# Patient Record
Sex: FEMALE | Race: ASIAN | Employment: OTHER | URBAN - METROPOLITAN AREA
[De-identification: names, ages, dates, MRNs, and addresses within clinical notes are randomized per-mention and may not be internally consistent; named-entity substitution may affect disease eponyms.]

---

## 2017-08-02 ENCOUNTER — HOSPITAL ENCOUNTER (OUTPATIENT)
Age: 42
Discharge: HOME | End: 2017-08-02
Attending: FAMILY MEDICINE
Payer: COMMERCIAL

## 2017-08-02 VITALS
RESPIRATION RATE: 16 BRPM | HEART RATE: 70 BPM | TEMPERATURE: 98 F | SYSTOLIC BLOOD PRESSURE: 114 MMHG | DIASTOLIC BLOOD PRESSURE: 70 MMHG | OXYGEN SATURATION: 99 %

## 2017-08-02 DIAGNOSIS — T19.2XXA FOREIGN BODY IN VULVA AND VAGINA, INITIAL ENCOUNTER: Primary | ICD-10-CM

## 2017-08-02 PROCEDURE — 99203 OFFICE O/P NEW LOW 30 MIN: CPT

## 2017-08-02 PROCEDURE — 99213 OFFICE O/P EST LOW 20 MIN: CPT

## 2017-08-02 RX ORDER — CETIRIZINE HYDROCHLORIDE 10 MG/1
10 TABLET ORAL DAILY
COMMUNITY

## 2017-08-02 NOTE — ED PROVIDER NOTES
[address]    History   No chief complaint on file. Stated Complaint: tampon stuck    HPI  47-year-old female presents to IC with a retained tampon.   Reports she put it in yesterday morning and the st Serial forceps very used to remove the foreign body. It was removed completely. The patient tolerated the procedure well without any complications.     Patient was counseled on warning signs and symptoms of when to get medical attention which he verbalize

## 2017-08-02 NOTE — ED INITIAL ASSESSMENT (HPI)
Pt here to IC with c/o stuck tampon in vagina since yesterday morning. Pt states she can not get it out. Pt denies any fevers, vaginal discharge or abd pain.